# Patient Record
Sex: MALE | Race: OTHER | NOT HISPANIC OR LATINO
[De-identification: names, ages, dates, MRNs, and addresses within clinical notes are randomized per-mention and may not be internally consistent; named-entity substitution may affect disease eponyms.]

---

## 2019-06-06 ENCOUNTER — LABORATORY RESULT (OUTPATIENT)
Age: 19
End: 2019-06-06

## 2019-06-06 ENCOUNTER — OUTPATIENT (OUTPATIENT)
Dept: OUTPATIENT SERVICES | Facility: HOSPITAL | Age: 19
LOS: 1 days | End: 2019-06-06

## 2019-06-06 ENCOUNTER — RESULT CHARGE (OUTPATIENT)
Age: 19
End: 2019-06-06

## 2019-06-06 ENCOUNTER — APPOINTMENT (OUTPATIENT)
Dept: OBGYN | Facility: HOSPITAL | Age: 19
End: 2019-06-06
Payer: MEDICAID

## 2019-06-06 VITALS
HEART RATE: 100 BPM | WEIGHT: 170 LBS | DIASTOLIC BLOOD PRESSURE: 72 MMHG | HEIGHT: 60 IN | BODY MASS INDEX: 33.38 KG/M2 | SYSTOLIC BLOOD PRESSURE: 129 MMHG

## 2019-06-06 DIAGNOSIS — Z87.738 PERSONAL HISTORY OF OTHER SPECIFIED (CORRECTED) CONGENITAL MALFORMATIONS OF DIGESTIVE SYSTEM: ICD-10-CM

## 2019-06-06 DIAGNOSIS — Z78.9 OTHER SPECIFIED HEALTH STATUS: ICD-10-CM

## 2019-06-06 DIAGNOSIS — F64.0 TRANSSEXUALISM: ICD-10-CM

## 2019-06-06 DIAGNOSIS — Z87.891 PERSONAL HISTORY OF NICOTINE DEPENDENCE: ICD-10-CM

## 2019-06-06 DIAGNOSIS — J45.990 EXERCISE INDUCED BRONCHOSPASM: ICD-10-CM

## 2019-06-06 DIAGNOSIS — R10.2 PELVIC AND PERINEAL PAIN: ICD-10-CM

## 2019-06-06 PROBLEM — Z00.00 ENCOUNTER FOR PREVENTIVE HEALTH EXAMINATION: Status: ACTIVE | Noted: 2019-06-06

## 2019-06-06 LAB
APPEARANCE UR: CLEAR — SIGNIFICANT CHANGE UP
BACTERIA # UR AUTO: NEGATIVE — SIGNIFICANT CHANGE UP
BILIRUB UR-MCNC: NEGATIVE — SIGNIFICANT CHANGE UP
BLOOD UR QL VISUAL: HIGH
COLOR SPEC: YELLOW — SIGNIFICANT CHANGE UP
GLUCOSE UR-MCNC: NEGATIVE — SIGNIFICANT CHANGE UP
HYALINE CASTS # UR AUTO: NEGATIVE — SIGNIFICANT CHANGE UP
KETONES UR-MCNC: NEGATIVE — SIGNIFICANT CHANGE UP
LEUKOCYTE ESTERASE UR-ACNC: NEGATIVE — SIGNIFICANT CHANGE UP
NITRITE UR-MCNC: NEGATIVE — SIGNIFICANT CHANGE UP
PH UR: 6 — SIGNIFICANT CHANGE UP (ref 5–8)
PROT UR-MCNC: 10 — SIGNIFICANT CHANGE UP
RBC CASTS # UR COMP ASSIST: HIGH (ref 0–?)
SP GR SPEC: 1.02 — SIGNIFICANT CHANGE UP (ref 1–1.04)
SQUAMOUS # UR AUTO: SIGNIFICANT CHANGE UP
UROBILINOGEN FLD QL: NORMAL — SIGNIFICANT CHANGE UP
WBC UR QL: SIGNIFICANT CHANGE UP (ref 0–?)

## 2019-06-06 PROCEDURE — 99203 OFFICE O/P NEW LOW 30 MIN: CPT | Mod: GE

## 2019-06-06 RX ORDER — TESTOSTERONE 30 MG/1.5ML
SOLUTION TOPICAL
Refills: 0 | Status: ACTIVE | COMMUNITY

## 2019-06-07 DIAGNOSIS — F64.0 TRANSSEXUALISM: ICD-10-CM

## 2019-06-07 DIAGNOSIS — Z78.9 OTHER SPECIFIED HEALTH STATUS: ICD-10-CM

## 2019-06-07 DIAGNOSIS — R10.2 PELVIC AND PERINEAL PAIN: ICD-10-CM

## 2019-06-07 DIAGNOSIS — Z87.891 PERSONAL HISTORY OF NICOTINE DEPENDENCE: ICD-10-CM

## 2019-06-07 DIAGNOSIS — J45.990 EXERCISE INDUCED BRONCHOSPASM: ICD-10-CM

## 2019-06-07 DIAGNOSIS — Z87.738 PERSONAL HISTORY OF OTHER SPECIFIED (CORRECTED) CONGENITAL MALFORMATIONS OF DIGESTIVE SYSTEM: ICD-10-CM

## 2019-06-07 LAB
HCG UR QL: NEGATIVE
HCG UR QL: NEGATIVE
QUALITY CONTROL: YES
QUALITY CONTROL: YES

## 2019-06-14 NOTE — PHYSICAL EXAM
[Normal] : uterus [Enlarged] : was enlarged [Scant] : there was scant vaginal bleeding [Nulliparous] : was nulliparous [Normal Position] : in a normal position [Adnexa Tenderness] : were tender on palpation [Tenderness] : no tenderness [Motion Tenderness] : there was no cervical motion tenderness [FreeTextEntry7] : mild

## 2019-06-14 NOTE — HISTORY OF PRESENT ILLNESS
[Bleeding] : bleeding [Staining] : described as blood tinged staining of undergarments [Pain] : pelvic pain [___ Months] : began [unfilled] months ago [Approximately ___ (Month)] : the LMP was approximately [unfilled] month(s) ago [Dizziness] : no dizziness [Palpitations] : no palpitations [BCP] : no BCP [Normal Amount/Duration] : was abnormal [Regular Cycle Intervals] : periods have been irregular

## 2019-06-17 ENCOUNTER — APPOINTMENT (OUTPATIENT)
Dept: ULTRASOUND IMAGING | Facility: IMAGING CENTER | Age: 19
End: 2019-06-17
Payer: MEDICAID

## 2019-06-17 ENCOUNTER — OUTPATIENT (OUTPATIENT)
Dept: OUTPATIENT SERVICES | Facility: HOSPITAL | Age: 19
LOS: 1 days | End: 2019-06-17
Payer: MEDICAID

## 2019-06-17 DIAGNOSIS — Z00.8 ENCOUNTER FOR OTHER GENERAL EXAMINATION: ICD-10-CM

## 2019-06-17 PROCEDURE — 76856 US EXAM PELVIC COMPLETE: CPT | Mod: 26,KX

## 2019-06-17 PROCEDURE — 76830 TRANSVAGINAL US NON-OB: CPT

## 2019-06-17 PROCEDURE — 76830 TRANSVAGINAL US NON-OB: CPT | Mod: 26,KX

## 2019-06-17 PROCEDURE — 76856 US EXAM PELVIC COMPLETE: CPT

## 2019-07-01 ENCOUNTER — OUTPATIENT (OUTPATIENT)
Dept: OUTPATIENT SERVICES | Facility: HOSPITAL | Age: 19
LOS: 1 days | End: 2019-07-01

## 2019-07-01 ENCOUNTER — APPOINTMENT (OUTPATIENT)
Dept: OBGYN | Facility: HOSPITAL | Age: 19
End: 2019-07-01
Payer: MEDICAID

## 2019-07-01 VITALS
HEIGHT: 60 IN | WEIGHT: 170 LBS | DIASTOLIC BLOOD PRESSURE: 75 MMHG | HEART RATE: 87 BPM | SYSTOLIC BLOOD PRESSURE: 127 MMHG | BODY MASS INDEX: 33.38 KG/M2

## 2019-07-01 DIAGNOSIS — N93.9 ABNORMAL UTERINE AND VAGINAL BLEEDING, UNSPECIFIED: ICD-10-CM

## 2019-07-01 PROCEDURE — 99213 OFFICE O/P EST LOW 20 MIN: CPT | Mod: GE,KX

## 2019-07-02 DIAGNOSIS — Z93.9 ARTIFICIAL OPENING STATUS, UNSPECIFIED: ICD-10-CM

## 2019-07-02 DIAGNOSIS — N93.9 ABNORMAL UTERINE AND VAGINAL BLEEDING, UNSPECIFIED: ICD-10-CM

## 2019-07-05 NOTE — HISTORY OF PRESENT ILLNESS
[Prolonged Menses] : prolonged menses [Staining] : described as blood tinged staining of undergarments [Approximately ___ (Month)] : the LMP was approximately [unfilled] month(s) ago [Pain] : no pelvic pain [Normal Amount/Duration] : was abnormal

## 2020-07-30 ENCOUNTER — LABORATORY RESULT (OUTPATIENT)
Age: 20
End: 2020-07-30

## 2020-07-30 ENCOUNTER — APPOINTMENT (OUTPATIENT)
Dept: OBGYN | Facility: HOSPITAL | Age: 20
End: 2020-07-30

## 2020-07-30 ENCOUNTER — OUTPATIENT (OUTPATIENT)
Dept: OUTPATIENT SERVICES | Facility: HOSPITAL | Age: 20
LOS: 1 days | End: 2020-07-30

## 2020-07-30 VITALS
WEIGHT: 164 LBS | SYSTOLIC BLOOD PRESSURE: 127 MMHG | TEMPERATURE: 97.9 F | BODY MASS INDEX: 32.2 KG/M2 | HEIGHT: 60 IN | DIASTOLIC BLOOD PRESSURE: 75 MMHG

## 2020-07-30 DIAGNOSIS — Z30.430 ENCOUNTER FOR INSERTION OF INTRAUTERINE CONTRACEPTIVE DEVICE: ICD-10-CM

## 2020-07-30 DIAGNOSIS — Z30.432 ENCOUNTER FOR REMOVAL OF INTRAUTERINE CONTRACEPTIVE DEVICE: ICD-10-CM

## 2020-07-30 RX ORDER — LEVONORGESTREL 52 MG/1
20 INTRAUTERINE DEVICE INTRAUTERINE
Refills: 0 | Status: ACTIVE | COMMUNITY
Start: 2020-07-30

## 2020-07-30 NOTE — COUNSELING
[Contraception] : contraception [Method Specific Consent] : method specific consent [Emergency Contraception] : emergency contraception

## 2020-07-30 NOTE — PROCEDURE
[IUD Placement] : intrauterine device (IUD) placement [Prevention of Pregnancy] : prevention of pregnancy [Infection] : infection [Bleeding] : bleeding [Pain] : pain [Expulsion] : expulsion [Uterine Perforation] : uterine perforation [Failure] : failure [CONSENT OBTAINED] : written consent was obtained prior to the procedure. [Neg GC/Chlamydia] : a a serum GC/Chlamydia was negative [No Premedication] : No premedication [Betadine] : Prepped with Betadine [None] : none [Uterus Sounded to ___cm] : sounded to [unfilled]Ucm [Easy Passage] : allowed easy passage of a uterine sound without dilation [Tenaculum] : a single toothed tenaculum [Mirena IUD] : The Mirena IUD was inserted past the internal cervical os. The IUD was then gently inserted upwards toward the fundus.  The IUD strings were cut to an appropriate length. [Post Placement Transvag. US] : post placement transvaginal ultrasound completed [Lot Number: ___] : IUD lot number: [unfilled] [Mirena] : Mirena [IUD Removal] : IUD [Patient] : patient [Risks] : risks [Alternatives] : alternatives [Benefits] : benefits [Consent Obtained] : consent was obtained prior to the procedure and is detailed in the patient's record [Time Started: ___] : Procedure Start Time: [unfilled] [IUD Removed - Forceps] : the strings were grasped with forceps and the IUD was removed [IUD Discarded] : discarded [Time Completed: ___] : Procedure Completion Time: [unfilled] [Tolerated Well] : the patient tolerated the procedure well [No Complications] : none [de-identified] : expulsion

## 2020-07-30 NOTE — PHYSICAL EXAM
[Normal] : cervix [Enlarged] : was enlarged [No Bleeding] : there was no active vaginal bleeding [Uterine Adnexae] : were not tender and not enlarged [FreeTextEntry5] : 2 mm of IUD protruding from os

## 2020-08-01 LAB
C TRACH RRNA SPEC QL NAA+PROBE: SIGNIFICANT CHANGE UP
N GONORRHOEA RRNA SPEC QL NAA+PROBE: SIGNIFICANT CHANGE UP
SPECIMEN SOURCE: SIGNIFICANT CHANGE UP

## 2020-08-05 DIAGNOSIS — Z30.430 ENCOUNTER FOR INSERTION OF INTRAUTERINE CONTRACEPTIVE DEVICE: ICD-10-CM

## 2020-08-05 DIAGNOSIS — Z30.432 ENCOUNTER FOR REMOVAL OF INTRAUTERINE CONTRACEPTIVE DEVICE: ICD-10-CM

## 2020-08-27 ENCOUNTER — OUTPATIENT (OUTPATIENT)
Dept: OUTPATIENT SERVICES | Facility: HOSPITAL | Age: 20
LOS: 1 days | End: 2020-08-27

## 2020-08-27 ENCOUNTER — APPOINTMENT (OUTPATIENT)
Dept: OBGYN | Facility: HOSPITAL | Age: 20
End: 2020-08-27

## 2020-08-27 VITALS
WEIGHT: 166 LBS | TEMPERATURE: 97.9 F | SYSTOLIC BLOOD PRESSURE: 125 MMHG | HEART RATE: 75 BPM | HEIGHT: 60 IN | DIASTOLIC BLOOD PRESSURE: 82 MMHG | BODY MASS INDEX: 32.59 KG/M2

## 2020-08-27 DIAGNOSIS — Z30.431 ENCOUNTER FOR ROUTINE CHECKING OF INTRAUTERINE CONTRACEPTIVE DEVICE: ICD-10-CM

## 2020-08-28 DIAGNOSIS — Z30.431 ENCOUNTER FOR ROUTINE CHECKING OF INTRAUTERINE CONTRACEPTIVE DEVICE: ICD-10-CM
